# Patient Record
Sex: MALE | Race: BLACK OR AFRICAN AMERICAN
[De-identification: names, ages, dates, MRNs, and addresses within clinical notes are randomized per-mention and may not be internally consistent; named-entity substitution may affect disease eponyms.]

---

## 2017-03-18 ENCOUNTER — HOSPITAL ENCOUNTER (EMERGENCY)
Dept: HOSPITAL 62 - ER | Age: 27
Discharge: HOME | End: 2017-03-18
Payer: OTHER GOVERNMENT

## 2017-03-18 VITALS — DIASTOLIC BLOOD PRESSURE: 71 MMHG | SYSTOLIC BLOOD PRESSURE: 122 MMHG

## 2017-03-18 DIAGNOSIS — K62.89: ICD-10-CM

## 2017-03-18 DIAGNOSIS — R05: Primary | ICD-10-CM

## 2017-03-18 DIAGNOSIS — F17.210: ICD-10-CM

## 2017-03-18 DIAGNOSIS — R50.9: ICD-10-CM

## 2017-03-18 DIAGNOSIS — R51: ICD-10-CM

## 2017-03-18 PROCEDURE — 87804 INFLUENZA ASSAY W/OPTIC: CPT

## 2017-03-18 PROCEDURE — 99283 EMERGENCY DEPT VISIT LOW MDM: CPT

## 2017-03-18 PROCEDURE — 87077 CULTURE AEROBIC IDENTIFY: CPT

## 2017-03-18 PROCEDURE — 87070 CULTURE OTHR SPECIMN AEROBIC: CPT

## 2017-03-18 PROCEDURE — 87880 STREP A ASSAY W/OPTIC: CPT

## 2017-03-18 PROCEDURE — 71020: CPT

## 2017-03-18 NOTE — ER DOCUMENT REPORT
ED General





- General


Chief Complaint: Fever


Stated Complaint: FEVER


Mode of Arrival: Ambulatory


Information source: Patient


Notes: 


Patient presents to the emergency department with complaints of severe headache 

fever chills and cough for the past 3 days.  Patient also reports rectal pain.  

Patient reports he is HIV positive.  He reports he recently had rectal 

intercourse at the end of February.  He did use protection and lubricant but 

believes the protection failed. He reports he has rectal irritation since that 

time. He denies bleeding. He reports he feels a bump.  Patient reports his CD4 

counts were undetectable in December.  In January he quit taking his 

medications because he felt depressed.  He denies fever vomiting diarrhea.  He 

reports his last bowel movement today was formed.


TRAVEL OUTSIDE OF THE [place] IN LAST 30 DAYS: No





- HPI


Onset: Other - 3 days


Quality of pain: Achy


Pain Level: 3


Associated symptoms: Nonproductive cough, Fever, Other - Rectal pain


Exacerbated by: Denies


Relieved by: Denies


Similar symptoms previously: No


Recently seen / treated by doctor: No





- Related Data


Allergies/Adverse Reactions: 


 





Penicillins Allergy (Verified 03/18/17 14:00)


 











Past Medical History





- General


Information source: Patient





- Social History


Smoking Status: Current Every Day Smoker


Cigarette use (# per day): Yes


Chew tobacco use (# tins/day): No


Frequency of alcohol use: None


Drug Abuse: Marijuana


Family History: Reviewed & Not Pertinent


Patient has suicidal ideation: No


Patient has homicidal ideation: No


Renal/ Medical History: Denies: Hx Peritoneal Dialysis


Musculoskeltal Medical History: Reports Hx Musculoskeletal Trauma


Psychiatric Medical History: Reports: Hx Depression - anxiety


Infectious Medical History: Reports: Hx HIV


Surgical Hx: Negative


Past Surgical History: Reports: Hx Adenoidectomy, Hx Tonsillectomy





- Immunizations


Hx Diphtheria, Pertussis, Tetanus Vaccination: Yes





Review of Systems





- Review of Systems


Notes: 


Review HPI for review of systems., All other systems negative





Physical Exam





- Vital signs


Vitals: 


 











Temp Pulse Resp BP Pulse Ox


 


 101.8 F H  113 H  20   132/90 H  100 


 


 03/18/17 13:58  03/18/17 13:58  03/18/17 13:58  03/18/17 13:58  03/18/17 13:58














- Notes


Notes: 


PHYSICAL EXAMINATION: 


GENERAL: Well-appearing and in no acute distress nontoxic looking


HEAD: Atraumatic, normocephalic. 


EYES: Pupils equal round  extraocular movements intact, sclera anicteric, 

conjunctiva are normal. 


ENT: TM Wnl, nares patent, oropharynx clear without exudates. Moist mucous 

membranes. No peritonsillar abscess good clear voice no trismus


NECK: Normal range of motion, supple without lymphadenopathy 


LUNGS: CTAB and equal. No wheezes rales or rhonchi. 


HEART: Regular rate and rhythm without murmurs 


ABDOMEN: Soft, no tenderness. No guarding, no rebound 


EXTREMITIES: Normal range of motion, no pitting edema. No cyanosis. 


NEUROLOGICAL: Cranial nerves grossly intact. Normal sensory/motor  


PSYCH: Normal mood, normal affect. 


SKIN: Warm, Dry, normal turgor, no rashes or lesions noted








- Rectal


Tenderness: No


Hemorrhoids: None


Notes: 


small pink area to ~1500 area that appears to resemble a skin tear, no sores, 

lesions, warts, hemorrhoids noted.





Course





- Re-evaluation


Re-evalutation: 


03/18/17 16:05


I have consulted  the attending provider Dr Gutierrez,per [facility] guidelines. Chest 

xray ordered, no further tests advised.


pt looks good, nontoxic looking. Pt advised to apply barrier cream to rectal 

area, avoid sexual intercourse until follow up with his provider for recheck. 

[facility]





 





 








- Vital Signs


Vital signs: 


 











Temp Pulse Resp BP Pulse Ox


 


 98.6 F   66   16   122/71   100 


 


 03/18/17 17:01  03/18/17 17:01  03/18/17 17:01  03/18/17 17:04  03/18/17 17:01














- Diagnostic Test


Radiology reviewed: Image reviewed, Reports reviewed - neg





Discharge





- Discharge


Clinical Impression: 


 Cough, Fever, Rectal irritation





Condition: Stable


Disposition: HOME, SELF-CARE


Instructions:  Acetaminophen, Fever ([facility])


Additional Instructions: 


*You have been evaluated for cold symptoms today, cough, fever, rectal 

irritation, elevated blood pressure


*Your strep test was negative. A throat culture is pending. If you need 

antibiotics you will be contacted.


*Quit smoking


 *Increase fluid intake as discussed


*Monitor your temperature, take Tylenol as indicated


*Apply barrier cream to rectal area as discussed.  Avoid rectal intercourse 

until follow up with your provider


*[facility]


*Follow up with your HIV provider Monday for your medications


*Return to ED for worsening condition, changes, needs, worsening cough, fever





Monitor your blood pressure. Your blood pressure was elevated today.  This may 

be because you were anxious, in pain or because you need medication.  It is 

important to follow up with your primary care provider for full evaluation.


Forms:  Smoking Cessation Education, Elevated Blood Pressure

## 2017-03-18 NOTE — ER DOCUMENT REPORT
ED Medical Screen (RME)





- General


Stated Complaint: FEVER


Notes: 


Patient is a 26-year-old male presents to the emergency Department complaining 

of body aches, headache, fever, chills, cough for the past 2 days.  Patient 

admits to sick contacts at home.  Denies taking any Tylenol or Motrin.  Denies 

receiving a flu vaccine this year.  





I have greeted and performed a rapid initial assessment of this patient. A 

comprehensive ED assessment and evaluation of the patient, analysis of test 

results and completion of the medical decision making process will be conducted 

by additional ED providers.


TRAVEL OUTSIDE OF THE [place] IN LAST 30 DAYS: No





- Related Data


Allergies/Adverse Reactions: 


 





Penicillins Allergy (Verified 03/18/17 14:00)


 











Past Medical History


Musculoskeltal Medical History: Reports Hx Musculoskeletal Trauma





- Immunizations


Hx Diphtheria, Pertussis, Tetanus Vaccination: Yes





Physical Exam





- Vital signs


Vitals: 





 











Temp Pulse Resp BP Pulse Ox


 


 101.8 F H  113 H  20   132/90 H  100 


 


 03/18/17 13:58  03/18/17 13:58  03/18/17 13:58  03/18/17 13:58  03/18/17 13:58














Course





- Vital Signs


Vital signs: 





 











Temp Pulse Resp BP Pulse Ox


 


 101.8 F H  113 H  20   132/90 H  100 


 


 03/18/17 13:58  03/18/17 13:58  03/18/17 13:58  03/18/17 13:58  03/18/17 13:58

## 2017-03-24 ENCOUNTER — HOSPITAL ENCOUNTER (EMERGENCY)
Dept: HOSPITAL 62 - ER | Age: 27
Discharge: ANOTHER HOSPITAL | End: 2017-03-24
Payer: OTHER GOVERNMENT

## 2017-03-24 VITALS — SYSTOLIC BLOOD PRESSURE: 108 MMHG | DIASTOLIC BLOOD PRESSURE: 75 MMHG

## 2017-03-24 DIAGNOSIS — R05: Primary | ICD-10-CM

## 2017-03-24 DIAGNOSIS — B20: ICD-10-CM

## 2017-03-24 DIAGNOSIS — R10.84: ICD-10-CM

## 2017-03-24 DIAGNOSIS — R50.9: ICD-10-CM

## 2017-03-24 DIAGNOSIS — R19.5: ICD-10-CM

## 2017-03-24 DIAGNOSIS — R19.7: ICD-10-CM

## 2017-03-24 LAB
%HYPO/RBC NFR BLD AUTO: (no result) %
ALBUMIN SERPL-MCNC: 4.6 G/DL (ref 3.5–5)
ALP SERPL-CCNC: 76 U/L (ref 38–126)
ALT SERPL-CCNC: 29 U/L (ref 21–72)
ANION GAP SERPL CALC-SCNC: 15 MMOL/L (ref 5–19)
ANISOCYTOSIS BLD QL SMEAR: (no result)
AST SERPL-CCNC: 27 U/L (ref 17–59)
BASOPHILS NFR BLD MANUAL: 0 % (ref 0–2)
BILIRUB DIRECT SERPL-MCNC: 0.4 MG/DL (ref 0–0.4)
BILIRUB SERPL-MCNC: 1.2 MG/DL (ref 0.2–1.3)
BUN SERPL-MCNC: 12 MG/DL (ref 7–20)
CALCIUM: 9.2 MG/DL (ref 8.4–10.2)
CHLORIDE SERPL-SCNC: 106 MMOL/L (ref 98–107)
CO2 SERPL-SCNC: 24 MMOL/L (ref 22–30)
CREAT SERPL-MCNC: 1 MG/DL (ref 0.52–1.25)
EOSINOPHIL NFR BLD MANUAL: 0 % (ref 0–6)
ERYTHROCYTE [DISTWIDTH] IN BLOOD BY AUTOMATED COUNT: 17.2 % (ref 11.5–14)
GLUCOSE SERPL-MCNC: 98 MG/DL (ref 75–110)
HCT VFR BLD CALC: 33.6 % (ref 37.9–51)
HGB BLD-MCNC: 11.7 G/DL (ref 13.5–17)
HGB HCT DIFFERENCE: 1.5
LIPASE SERPL-CCNC: 22.2 U/L (ref 23–300)
MCH RBC QN AUTO: 30.8 PG (ref 27–33.4)
MCHC RBC AUTO-ENTMCNC: 34.7 G/DL (ref 32–36)
MCV RBC AUTO: 89 FL (ref 80–97)
OVALOCYTES BLD QL SMEAR: SLIGHT
POIKILOCYTOSIS BLD QL SMEAR: SLIGHT
POLYCHROMASIA BLD QL SMEAR: SLIGHT
POTASSIUM SERPL-SCNC: 3.9 MMOL/L (ref 3.6–5)
PROT SERPL-MCNC: 9 G/DL (ref 6.3–8.2)
RBC # BLD AUTO: 3.79 10^6/UL (ref 4.35–5.55)
ROULEAUX BLD QL SMEAR: SLIGHT
SODIUM SERPL-SCNC: 144.7 MMOL/L (ref 137–145)
TOTAL CELLS COUNTED BLD: 100
TOXIC GRANULES BLD QL SMEAR: SLIGHT
VARIANT LYMPHS NFR BLD MANUAL: 17 % (ref 13–45)
WBC # BLD AUTO: 4.5 10^3/UL (ref 4–10.5)

## 2017-03-24 PROCEDURE — 71020: CPT

## 2017-03-24 PROCEDURE — 94640 AIRWAY INHALATION TREATMENT: CPT

## 2017-03-24 PROCEDURE — 36415 COLL VENOUS BLD VENIPUNCTURE: CPT

## 2017-03-24 PROCEDURE — 85025 COMPLETE CBC W/AUTO DIFF WBC: CPT

## 2017-03-24 PROCEDURE — 51701 INSERT BLADDER CATHETER: CPT

## 2017-03-24 PROCEDURE — 83690 ASSAY OF LIPASE: CPT

## 2017-03-24 PROCEDURE — 80053 COMPREHEN METABOLIC PANEL: CPT

## 2017-03-24 PROCEDURE — 99285 EMERGENCY DEPT VISIT HI MDM: CPT

## 2017-03-24 NOTE — ER DOCUMENT REPORT
ED Medical Screen (RME)





- General


Stated Complaint: BLOODY STOOL,ABDOMINAL PAIN


Notes: 


Patient complains of abdominal pain with bloody stools for 2 days.  Also has 

chest congestion.  States was seen last Friday the same.  Patient states last 

fever was Tuesday, but does have productive cough.  Unable to obtain full 

patient history.  Patient states he feels uncomfortable discussing here in 

triage.





I have greeted and performed a rapid initial assessment of this patient.  A 

comprehensive ED assessment and evaluation of the patient, analysis of test 

results and completion of the medical decision making process will be conducted 

by additional ED providers.


TRAVEL OUTSIDE OF THE [place] IN LAST 30 DAYS: No





- Related Data


Allergies/Adverse Reactions: 


 





Penicillins Allergy (Verified 03/24/17 11:42)


 











Past Medical History


Renal/ Medical History: Denies: Hx Peritoneal Dialysis


Musculoskeltal Medical History: Reports Hx Musculoskeletal Trauma


Psychiatric Medical History: Reports: Hx Depression - anxiety


Infectious Medical History: Reports: Hx HIV


Past Surgical History: Reports: Hx Adenoidectomy, Hx Tonsillectomy





- Immunizations


Hx Diphtheria, Pertussis, Tetanus Vaccination: Yes





Physical Exam





- Vital signs


Vitals: 


 











Temp Pulse Resp BP Pulse Ox


 


 99.2 F   85   16   124/84   100 


 


 03/24/17 11:36  03/24/17 11:36  03/24/17 11:36  03/24/17 11:36  03/24/17 11:36














Course





- Vital Signs


Vital signs: 


 











Temp Pulse Resp BP Pulse Ox


 


 99.2 F   85   16   124/84   100 


 


 03/24/17 11:36  03/24/17 11:36  03/24/17 11:36  03/24/17 11:36  03/24/17 11:36

## 2017-03-24 NOTE — ER DOCUMENT REPORT
ED General





- General


Chief Complaint: Abdominal Pain


Stated Complaint: BLOODY STOOL,ABDOMINAL PAIN


Mode of Arrival: Ambulatory


Information source: Patient


Notes: 


26-year-old male history of HIV who is been off his medications for a few 

months with a previous CD4 count of 40 presents by private vehicle with 

concerns of cough fever and bloody diarrhea.  I was notified by the patient's 

infectious disease doctors nurse the patient was here a few days ago diagnosed 

as a viral syndrome and was discharged home symptoms have worsened since


TRAVEL OUTSIDE OF THE [place] IN LAST 30 DAYS: No





- HPI


Onset: Last week


Onset/Duration: Persistent


Quality of pain: Achy


Severity: Mild


Pain Level: 1





- Related Data


Allergies/Adverse Reactions: 


 





Penicillins Allergy (Verified 03/24/17 11:42)


 











Past Medical History





- Social History


Smoking Status: Never Smoker


Chew tobacco use (# tins/day): No


Frequency of alcohol use: None


Drug Abuse: None


Family History: Reviewed & Not Pertinent


Renal/ Medical History: Denies: Hx Peritoneal Dialysis


Musculoskeltal Medical History: Reports Hx Musculoskeletal Trauma


Psychiatric Medical History: Reports: Hx Depression - anxiety


Infectious Medical History: Reports: Hx HIV


Past Surgical History: Reports: Hx Adenoidectomy, Hx Tonsillectomy





- Immunizations


Hx Diphtheria, Pertussis, Tetanus Vaccination: Yes





Physical Exam





- Vital signs


Vitals: 


 











Temp Pulse Resp BP Pulse Ox


 


 99.2 F   85   16   124/84   100 


 


 03/24/17 11:36  03/24/17 11:36  03/24/17 11:36  03/24/17 11:36  03/24/17 11:36














Course





- Re-evaluation


Re-evalutation: 


03/24/17 13:55


[facility] paged


will transfer





03/24/17 14:14








03/24/17 14:24








03/24/17 14:32


Spoke with Dr Renee who will accept the patient 





- Vital Signs


Vital signs: 


 











Temp Pulse Resp BP Pulse Ox


 


 99.2 F   85   17   124/84   100 


 


 03/24/17 11:36  03/24/17 11:36  03/24/17 12:46  03/24/17 11:36  03/24/17 12:46














- Laboratory


Result Diagrams: 


 03/24/17 12:40





 03/24/17 12:40


Laboratory results interpreted by me: 


 











  03/24/17 03/24/17





  12:40 12:40


 


RBC  3.79 L 


 


Hgb  11.7 L 


 


Hct  33.6 L 


 


RDW  17.2 H 


 


Plt Count  113 L 


 


Total Protein   9.0 H


 


Lipase   22.2 L














- Diagnostic Test


Radiology reviewed: Image reviewed, Reports reviewed





Discharge





- Discharge


Clinical Impression: 


 Cough, Human immunodeficiency virus (HIV) infection





Fever


Qualifiers:


 Fever type: unspecified Qualified Code(s): R50.9 - Fever, unspecified





Abdominal pain


Qualifiers:


 Abdominal location: generalized Qualified Code(s): R10.84 - Generalized 

abdominal pain





Condition: Stable


Disposition: [facility]

## 2019-03-22 ENCOUNTER — HOSPITAL ENCOUNTER (EMERGENCY)
Dept: HOSPITAL 62 - ER | Age: 29
Discharge: HOME | End: 2019-03-22
Payer: OTHER GOVERNMENT

## 2019-03-22 VITALS — SYSTOLIC BLOOD PRESSURE: 149 MMHG | DIASTOLIC BLOOD PRESSURE: 74 MMHG

## 2019-03-22 DIAGNOSIS — F17.210: ICD-10-CM

## 2019-03-22 DIAGNOSIS — K04.7: Primary | ICD-10-CM

## 2019-03-22 DIAGNOSIS — Z88.0: ICD-10-CM

## 2019-03-22 DIAGNOSIS — K02.9: ICD-10-CM

## 2019-03-22 DIAGNOSIS — R51: ICD-10-CM

## 2019-03-22 DIAGNOSIS — R50.9: ICD-10-CM

## 2019-03-22 DIAGNOSIS — Z21: ICD-10-CM

## 2019-03-22 PROCEDURE — 99283 EMERGENCY DEPT VISIT LOW MDM: CPT

## 2019-03-22 NOTE — ER DOCUMENT REPORT
ED ENT





- General


Chief Complaint: Facial Swelling


Stated Complaint: FACIAL SWELLING, PAIN, SINUS PRESSURE


Time Seen by Provider: 03/22/19 12:14


Mode of Arrival: Ambulatory


Information source: Patient, [facility] Records


Notes: 





28-year-old male patient reports waking up with right face swelling today and 

headache.  He also reports fever.  He is afebrile at this time.  He thinks it is

related to a bad tooth.


TRAVEL OUTSIDE OF THE [place] IN LAST 30 DAYS: No





- Related Data


Allergies/Adverse Reactions: 


                                        





Penicillins Allergy (Verified 03/22/19 11:43)


   











Past Medical History





- General


Information source: Patient, [facility] Records





- Social History


Smoking Status: Current Some Day Smoker


Cigarette use (# per day): Yes


Chew tobacco use (# tins/day): No


Smoking Education Provided: No


Frequency of alcohol use: None


Drug Abuse: None


Occupation: Unemployed


Lives with: Parents


Family History: Reviewed & Not Pertinent


Patient has suicidal ideation: No


Patient has homicidal ideation: No


Musculoskeletal Medical History: Reports Hx Musculoskeletal Trauma


Psychiatric Medical History: Reports: Hx Anxiety, Hx Depression


Infectious Medical History: Reports: Hx HIV


Past Surgical History: Reports: Hx Adenoidectomy, Hx Tonsillectomy





- Immunizations


Hx Diphtheria, Pertussis, Tetanus Vaccination: Yes





Review of Systems





- Review of Systems


Constitutional: No symptoms reported


EENT: Dental problem


Cardiovascular: No symptoms reported


Respiratory: No symptoms reported


Gastrointestinal: No symptoms reported


Genitourinary: No symptoms reported


Musculoskeletal: No symptoms reported


Skin: No symptoms reported


Hematologic/Lymphatic: No symptoms reported


Neurological/Psychological: No symptoms reported





Physical Exam





- Vital signs


Vitals: 


                                        











Temp Pulse Resp BP Pulse Ox


 


 98.3 F   68   16   149/74 H  100 


 


 03/22/19 11:45  03/22/19 11:45  03/22/19 11:45  03/22/19 11:45  03/22/19 11:45














- General


General appearance: Appears well, Alert


In distress: None





- HEENT


Head: Normocephalic, Atraumatic


Eyes: Normal


Pupils: PERRL


Mouth/Lips: Other - The right upper second premolar is decayed and broken out.  

It is quite tender to percuss.  There is no abscess noted in the gum region 

around the tooth.


Pharynx: Normal


Neck: Normal


Notes: 





There is some swelling to the face in the infraorbital area that is a little 

tender.





- Respiratory


Respiratory status: No respiratory distress





- Cardiovascular


Rhythm: Regular





- Abdominal


Inspection: Normal





- Back


Back: Normal





- Extremities


General upper extremity: Normal inspection


General lower extremity: Normal inspection





- Neurological


Neuro grossly intact: Yes





- Psychological


Associated symptoms: Normal affect, Normal mood





- Skin


Skin Temperature: Warm


Skin Moisture: Dry


Skin Color: Normal





Course





- Vital Signs


Vital signs: 


                                        











Temp Pulse Resp BP Pulse Ox


 


 98.3 F   68   16   149/74 H  100 


 


 03/22/19 11:45  03/22/19 11:45  03/22/19 11:45  03/22/19 11:45  03/22/19 11:45














Discharge





- Discharge


Clinical Impression: 


 Dental infection





Condition: Stable


Disposition: HOME, SELF-CARE


Additional Instructions: 


Dental Infection or Abscess





     You have an infection, perhaps an abscess (pus formation) of the gum around

one of your teeth, which is probably decayed.  If there is an abscess, it may 

drain on its own or it may need to be opened or lanced.  Severe swelling or 

drainage around a tooth usually means a deep dental abscess which usually 

requires evaluation and treatment by a dentist or oral surgeon.  Antibiotics may

be prescribed while awaiting dental treatment.


     If you develop high fever with chills, worsening pain, or increasing 

swelling in the area, see a dentist or oral surgeon immediately or return to the

Emergency Department immediately.








 

***********************************************************************************************************************





Take medications as prescribed.


Take ibuprofen every 8 hours for pain and inflammation.  Take Tylenol every 4 

hours for pain.


Use warm soaks to the swollen face area.


Follow-up with a dentist next week.





RETURN TO THE EMERGENCY ROOM IF ANY NEW OR WORSENING SYMPTOMS.








Prescriptions: 


Clindamycin HCl 300 mg PO QID #28 capsule